# Patient Record
Sex: FEMALE | Race: WHITE | NOT HISPANIC OR LATINO | Employment: OTHER | URBAN - METROPOLITAN AREA
[De-identification: names, ages, dates, MRNs, and addresses within clinical notes are randomized per-mention and may not be internally consistent; named-entity substitution may affect disease eponyms.]

---

## 2021-05-25 ENCOUNTER — RECORDS - HEALTHEAST (OUTPATIENT)
Dept: ADMINISTRATIVE | Facility: CLINIC | Age: 86
End: 2021-05-25

## 2021-05-26 ENCOUNTER — RECORDS - HEALTHEAST (OUTPATIENT)
Dept: ADMINISTRATIVE | Facility: CLINIC | Age: 86
End: 2021-05-26

## 2021-05-27 ENCOUNTER — RECORDS - HEALTHEAST (OUTPATIENT)
Dept: ADMINISTRATIVE | Facility: CLINIC | Age: 86
End: 2021-05-27

## 2021-05-28 ENCOUNTER — RECORDS - HEALTHEAST (OUTPATIENT)
Dept: ADMINISTRATIVE | Facility: CLINIC | Age: 86
End: 2021-05-28

## 2021-07-13 ENCOUNTER — RECORDS - HEALTHEAST (OUTPATIENT)
Dept: ADMINISTRATIVE | Facility: CLINIC | Age: 86
End: 2021-07-13

## 2021-07-23 ENCOUNTER — RECORDS - HEALTHEAST (OUTPATIENT)
Dept: ADMINISTRATIVE | Facility: CLINIC | Age: 86
End: 2021-07-23

## 2022-10-06 ENCOUNTER — HOSPITAL ENCOUNTER (EMERGENCY)
Facility: HOSPITAL | Age: 87
Discharge: HOME | End: 2022-10-06
Payer: COMMERCIAL

## 2022-10-06 VITALS
HEIGHT: 61 IN | HEART RATE: 67 BPM | SYSTOLIC BLOOD PRESSURE: 181 MMHG | DIASTOLIC BLOOD PRESSURE: 77 MMHG | OXYGEN SATURATION: 98 % | BODY MASS INDEX: 21.37 KG/M2 | WEIGHT: 113.2 LBS | TEMPERATURE: 98.3 F | RESPIRATION RATE: 16 BRPM

## 2022-10-06 DIAGNOSIS — I48.20 CHRONIC ATRIAL FIBRILLATION (H): ICD-10-CM

## 2022-10-06 DIAGNOSIS — R20.2 HAND TINGLING: Primary | ICD-10-CM

## 2022-10-06 LAB
ANION GAP SERPL CALCULATED.3IONS-SCNC: 13 MMOL/L (ref 7–15)
BASOPHILS # BLD AUTO: 0.1 10E3/UL (ref 0–0.2)
BASOPHILS NFR BLD AUTO: 1 %
BUN SERPL-MCNC: 24.8 MG/DL (ref 8–23)
CALCIUM SERPL-MCNC: 9.9 MG/DL (ref 8.2–9.6)
CHLORIDE SERPL-SCNC: 98 MMOL/L (ref 98–107)
CREAT SERPL-MCNC: 1.27 MG/DL (ref 0.51–0.95)
DEPRECATED HCO3 PLAS-SCNC: 26 MMOL/L (ref 22–29)
EOSINOPHIL # BLD AUTO: 0.2 10E3/UL (ref 0–0.7)
EOSINOPHIL NFR BLD AUTO: 2 %
ERYTHROCYTE [DISTWIDTH] IN BLOOD BY AUTOMATED COUNT: 13.6 % (ref 10–15)
GFR SERPL CREATININE-BSD FRML MDRD: 39 ML/MIN/1.73M2
GLUCOSE SERPL-MCNC: 104 MG/DL (ref 70–99)
HCT VFR BLD AUTO: 38.4 % (ref 35–47)
HGB BLD-MCNC: 12.2 G/DL (ref 11.7–15.7)
HOLD SPECIMEN: NORMAL
IMM GRANULOCYTES # BLD: 0 10E3/UL
IMM GRANULOCYTES NFR BLD: 0 %
LYMPHOCYTES # BLD AUTO: 1.9 10E3/UL (ref 0.8–5.3)
LYMPHOCYTES NFR BLD AUTO: 24 %
MAGNESIUM SERPL-MCNC: 1.9 MG/DL (ref 1.7–2.3)
MCH RBC QN AUTO: 32.9 PG (ref 26.5–33)
MCHC RBC AUTO-ENTMCNC: 31.8 G/DL (ref 31.5–36.5)
MCV RBC AUTO: 104 FL (ref 78–100)
MONOCYTES # BLD AUTO: 0.8 10E3/UL (ref 0–1.3)
MONOCYTES NFR BLD AUTO: 11 %
NEUTROPHILS # BLD AUTO: 4.8 10E3/UL (ref 1.6–8.3)
NEUTROPHILS NFR BLD AUTO: 62 %
NRBC # BLD AUTO: 0 10E3/UL
NRBC BLD AUTO-RTO: 0 /100
PLATELET # BLD AUTO: 199 10E3/UL (ref 150–450)
POTASSIUM SERPL-SCNC: 4.1 MMOL/L (ref 3.4–5.3)
RBC # BLD AUTO: 3.71 10E6/UL (ref 3.8–5.2)
SODIUM SERPL-SCNC: 137 MMOL/L (ref 136–145)
WBC # BLD AUTO: 7.7 10E3/UL (ref 4–11)

## 2022-10-06 PROCEDURE — 83735 ASSAY OF MAGNESIUM: CPT | Performed by: EMERGENCY MEDICINE

## 2022-10-06 PROCEDURE — 36415 COLL VENOUS BLD VENIPUNCTURE: CPT | Performed by: EMERGENCY MEDICINE

## 2022-10-06 PROCEDURE — 99283 EMERGENCY DEPT VISIT LOW MDM: CPT

## 2022-10-06 PROCEDURE — 85025 COMPLETE CBC W/AUTO DIFF WBC: CPT | Performed by: EMERGENCY MEDICINE

## 2022-10-06 PROCEDURE — 82310 ASSAY OF CALCIUM: CPT | Performed by: EMERGENCY MEDICINE

## 2022-10-06 ASSESSMENT — ENCOUNTER SYMPTOMS
NAUSEA: 0
CHILLS: 0
ABDOMINAL PAIN: 0
DYSURIA: 0
FEVER: 0
VOMITING: 0
SHORTNESS OF BREATH: 0
NECK PAIN: 0
FREQUENCY: 0
BACK PAIN: 0
COUGH: 0
WOUND: 0

## 2022-10-06 NOTE — DISCHARGE INSTRUCTIONS
Please bring this paperwork with you to your follow-up appointment.    You were seen in the urgent care/emergency department for tingling in left fingers.     Your electrolytes look normal today. We suspect this is a nerve problem, especially since it is worse at night when you sleep on your left arm, however, we were unable to rule out a stroke today. We recommended an MRI today, but this was declined. If symptoms continue or do not get  better, please go to an ER for MRI to make sure this is not a stroke.     For your symptoms:  Please wear the wrist splint at night to see if this helps with the tingling. If it does, then this is likely a nerve problem. Please called your orthopedic specialist or call [facility] at the number provided for ongoing management of your nerve problems.     Tylenol/ibuprofen as needed  You may take up to 650 mg of Tylenol (acetaminophen) up to 4 times daily and up to 600 mg of ibuprofen up to 4 times daily as needed for fever, pain.  Please do not take more than the daily maximum recommended dose (tylenol = 4 grams, ibuprofen = 2.4 grams) as it can cause harm to your liver, kidneys, stomach.  It is best to take ibuprofen with food. Please read labels of any over-the-counter medicine you may be taking as it may contain Tylenol (acetaminophen) or Advil (ibuprofen).     Follow up with your primary care provider for recheck in 3 days for ER follow up.     A referral was placed for you to establish care with a cardiologist. They will call you to make an appointment.     Return to the emergency department if you develop worsening numbness or tingling, facial drooping, difficulty speaking, or any other new worsening or concerning symptoms. We'd be happy to see you again.    Thank you for allowing us to be part of your care today.    Take care!  -Roxie Pardo PA-C

## 2022-10-06 NOTE — ED TRIAGE NOTES
Patient brought to ED for evaluation of intermittent left hand/finger numbness. First episode was night before last, which lasted few minutes and resolved and another episode last night at 0300 hours which also resolved after a few minutes. Denies currently.     Triage Assessment     Row Name 10/06/22 1513       Triage Assessment (Adult)    Airway WDL WDL       Respiratory WDL    Respiratory WDL WDL       Skin Circulation/Temperature WDL    Skin Circulation/Temperature WDL WDL       Cardiac WDL    Cardiac WDL WDL       Peripheral/Neurovascular WDL    Peripheral Neurovascular WDL WDL       Cognitive/Neuro/Behavioral WDL    Cognitive/Neuro/Behavioral WDL WDL

## 2022-10-06 NOTE — ED PROVIDER NOTES
EMERGENCY DEPARTMENT ENCOUNTER      NAME: Olamide Grewal  AGE: 89 year old female  YOB: 1928  MRN: [id number]  EVALUATION DATE & TIME: No admission date for patient encounter.    PCP: [facility]    ED PROVIDER: Roxie Pardo PA-C      No chief complaint on file.    FINAL IMPRESSION:  1. Hand tingling    2. Chronic atrial fibrillation (H)          MEDICAL DECISION MAKING:    Pertinent Labs & Imaging studies reviewed. (See chart for details)  Olamide Grewal is a 89 year old femalewho presents for evaluation of tingling in the fingers of her left hand. Reports this has occurred twice over the past week, notices symptoms when she wakes up from sleep after laying on her left arm, symptoms do not occur during the day. Denies facial drooping, numbness, difficulty speaking.      On my initial evaluation, patient slightly hypertensive, but remainder of  vital signs normal. On physical exam patient is awake, alert, no acute distress, resting comfortably in chair.  Was able to ambulate to the chair without difficulty.  Heart sounds are normal, lungs are clear.  No abdominal tenderness on palpation.  No neck or back tenderness over her CTLS.  No pain on palpation of her left shoulder, elbow, wrist.  Normal strength and sensation bilaterally, negative Tinel's sign, negative Phalen sign.  Good cap refill bilaterally, good pulses.  Full neurologic exam without focal deficit.  Cranial nerves III through XII intact..    Differential diagnosis includes ulnar nerve pathology, radial nerve pathology, carpal tunnel syndrome, cervical spine pathology, electrolyte imbalance, CVA, brain tumor, intracranial bleed or other intracranial pathology. Emergency department workup included cbc, bmp, Mg. Patient declined wanting anything for pain.     Electrolytes within normal limits. Based on patient history and complete resolution of symptoms on exam, could be radiculopathy in nature especially since  symptoms come on at night after she lays on her left arm, though could be CVA/ TIA, did recommend to patient that we obtain an MRI for further evaluation, however patient verbalizes frustration with long wait times and would like to be discharged without MRI. Did discuss importance of obtaining imaging to rule out stroke, bleed, tumor or other intracranial pathology, however patient is adamant that she is not having a stroke and would like to leave. Low suspicion for acute intracranial pathology, however did discuss with patient the importance of returning or going to another ER for ongoing or worsening symptoms.     Did provide wrist splint and gave patient referral information for [facility] orthopedics for ongoing management of possible nerve pathology such as carpal tunnel syndrome or cervical neck pathology. Also placed order for cardiology as patient would like to find a new cardiologist for ongoing management of her atrial fibrillation.     Patient has had serial examinations and notes significant improvement.     Patient was discharged in stable condition with treatment plan as below. Instructed to follow up with primary care provider in 3 days and return to the emergency department with any new or worsening of symptoms. Patient expressed understanding, feels comfortable, and is in agreement with this plan. All questions addressed prior to discharge.    ED COURSE:  4:11 PM  I reviewed the patient's chart. I met with the patient to gather history and to perform my initial exam.    I wore appropriate PPE during this encounter including: facemask & eye protection   6:00 PM  I rechecked the patient and updated her on results. Discussed MRI patient declines this and voiced frustration with long wait times.  6:19 PM  We discussed plan for discharge including treatment plan, follow-up and return precautions to emergency department.  Patient voiced understanding and in agreement with this plan.    At the conclusion of  "the encounter I discussed the results of all of the tests and the disposition. The questions were answered. The patient or family acknowledged understanding and was agreeable with the care plan.     MEDICATIONS GIVEN IN THE EMERGENCY:  Medications - No data to display    NEW PRESCRIPTIONS STARTED AT TODAY'S ER VISIT  There are no discharge medications for this patient.      =================================================================    HPI:    Patient information was obtained from: patient and PCA    Use of Interpretor: N/A         Olamide Grewal is a 89 year old female with a pertinent history of chronic atrial fibrillation, heart failure,  Hypothyroidism, chronic right shoulder pain from rotator cuff tendinopathy who presents to this ED for evaluation of left hand tingling.     Patient reports for the past 2 nights, she has woken up with left hand numbness and tingling.  Denies any pain or weakness.  Reports the tingling is mostly in her fingers, including all 5 fingers.  She sleeps on her left side.  Reports symptoms wake her up around 3 AM and she \"places her fingers under her left thigh to warm them up\" which help resolve the symptoms.  Has a history of mastectomy on the left side.  Reports symptoms only happened twice and have not returned today. Denies pain in her right arm. Denies pain in her shoulder, neck, back, elbow.  Denies facial drooping, difficulty speaking, facial numbness or tingling, difficulty walking or other weakness.  She has the same symptoms in her right arm from a rotator cuff surgery years ago and has Numbness and tingling in her right hand, for which she wears a brace at night for and received cortisone shots for.  Denies known injury or falls, denies head injury, loss of consciousness.  Reorts same symptoms happened about 2 weeks ago, but she did not think anything of it as it only happened 1 time.  Denies chest pain, shortness of breath, headache, lightheadedness, nausea, vomiting, " "abdominal pain, lower extremity weakness, difficulty walking, urinary symptoms.     REVIEW OF SYSTEMS:  Review of Systems   Constitutional: Negative for chills and fever.   Respiratory: Negative for cough and shortness of breath.    Cardiovascular: Negative for chest pain and leg swelling.   Gastrointestinal: Negative for abdominal pain, nausea and vomiting.   Genitourinary: Negative for dysuria and frequency.   Musculoskeletal: Negative for back pain, gait problem and neck pain.        Positive for left finger tingling.   Skin: Negative for rash and wound.   All other systems reviewed and are negative.       PAST MEDICAL HISTORY:  No past medical history on file.    PAST SURGICAL HISTORY:  No past surgical history on file.    CURRENT MEDICATIONS:    No current facility-administered medications for this encounter.  No current outpatient medications on file.    ALLERGIES:  No Known Allergies    FAMILY HISTORY:  No family history on file.    SOCIAL HISTORY:   Social History     Socioeconomic History     Marital status:        VITALS:  Patient Vitals for the past 24 hrs:   BP Temp Temp src Pulse Resp SpO2 Height Weight   10/06/22 1851 (!) 181/77 -- -- 67 16 98 % -- --   10/06/22 1506 (!) 163/59 98.3  F (36.8  C) Temporal 68 18 100 % 1.549 m (5' 1\") 51.3 kg (113 lb 3.2 oz)       PHYSICAL EXAM    Constitutional: Well developed, Well nourished, NAD  HENT: Normocephalic, Atraumatic, Bilateral external ears normal, Oropharynx normal, mucous membranes moist, Nose normal.   Neck: Normal range of motion, No tenderness, Supple, No stridor.  Eyes: PERRL, EOMI, Conjunctiva normal, No discharge.   Respiratory: Normal breath sounds, No respiratory distress, No wheezing, Speaks full sentences easily. No cough.  Cardiovascular: Normal heart rate, Regular rhythm, No murmurs, No rubs, No gallops. Chest wall nontender.  GI: Soft, No tenderness, No masses, No flank tenderness. No rebound or guarding.  Musculoskeletal: 2+ DP " pulses. No edema. No cyanosis, No clubbing. Good range of motion in all major joints. No tenderness to palpation or major deformities noted. No tenderness of the CTLS spine.   Integument: Warm, Dry, No erythema, No rash. No petechiae.  Neurologic: Alert & oriented x 3, Normal motor function, Normal sensory function, No focal deficits noted. Normal gait. negative Tinel's sign, negative Phalen sign.    Psychiatric: Affect normal, Judgment normal, Mood normal. Cooperative.    LAB:  All pertinent labs reviewed and interpreted.  Labs Ordered and Resulted from Time of ED Arrival to Time of ED Departure   BASIC METABOLIC PANEL - Abnormal       Result Value    Sodium 137      Potassium 4.1      Chloride 98      Carbon Dioxide (CO2) 26      Anion Gap 13      Urea Nitrogen 24.8 (*)     Creatinine 1.27 (*)     Calcium 9.9 (*)     Glucose 104 (*)     GFR Estimate 39 (*)    CBC WITH PLATELETS AND DIFFERENTIAL - Abnormal    WBC Count 7.7      RBC Count 3.71 (*)     Hemoglobin 12.2      Hematocrit 38.4       (*)     MCH 32.9      MCHC 31.8      RDW 13.6      Platelet Count 199      % Neutrophils 62      % Lymphocytes 24      % Monocytes 11      % Eosinophils 2      % Basophils 1      % Immature Granulocytes 0      NRBCs per 100 WBC 0      Absolute Neutrophils 4.8      Absolute Lymphocytes 1.9      Absolute Monocytes 0.8      Absolute Eosinophils 0.2      Absolute Basophils 0.1      Absolute Immature Granulocytes 0.0      Absolute NRBCs 0.0     MAGNESIUM - Normal    Magnesium 1.9         RADIOLOGY:  Reviewed all pertinent imaging. Please see official radiology report.  No orders to display     Diagnosis:  1. Hand tingling    2. Chronic atrial fibrillation (H)        Roxie Pardo PA-C  Emergency Medicine  [facility]  10/6/2022       Roxie Pardo PA-C  10/07/22 0000

## 2022-10-06 NOTE — ED NOTES
ED Provider In Triage Note  [facility]  Encounter Date: Oct 6, 2022    No chief complaint on file.      Brief HPI:   Olamide Grewal is a 89 year old female presenting to the Emergency Department with a chief complaint of left finger numbness   Intermittent for the last 2 days  No pain  No weakness  Unknown if change in color  Sometimes runs under hot water  Has numbness in the right fingers but better with steroid shot in rotator cuff  Same feelings now on left hand  No cp  No sob  No weakness  Or pain    Brief Physical Exam:  There were no vitals taken for this visit.  General: Non-toxic appearing  Well appearing  Strong pulses  Normal sensation  History of left mastectomy  No edema      Left fingers numbnes for 2 days  At night  Better after warming arm under leg  Occur between 3 and 4 am  Same on right hand but better with steroid injection    Plan Initiated in Triage:  No orders of the defined types were placed in this encounter.      PIT Dispo:   Return to lobby while awaiting workup and ED bed availability    An Sanchez MD on 10/6/2022 at 3:04 PM    Patient was evaluated by the Physician in Triage due to a limitation of available rooms in the Emergency Department. A plan of care was discussed based on the information obtained on the initial evaluation and patient was consuled to return back to the Emergency Department lobby after this initial evalutaiton until results were obtained or a room became available in the Emergency Department. Patient was counseled not to leave prior to receiving the results of their workup.     An Sanchez MD  [facility] EMERGENCY DEPARTMENT  [address] [place] [id number]  [phone]       An Sanchez MD  10/06/22 0896

## 2024-06-20 ENCOUNTER — LAB REQUISITION (OUTPATIENT)
Dept: LAB | Facility: CLINIC | Age: 89
End: 2024-06-20

## 2024-06-20 DIAGNOSIS — E03.9 HYPOTHYROIDISM, UNSPECIFIED: ICD-10-CM

## 2024-06-20 DIAGNOSIS — I10 ESSENTIAL (PRIMARY) HYPERTENSION: ICD-10-CM

## 2024-06-20 PROCEDURE — 84443 ASSAY THYROID STIM HORMONE: CPT | Performed by: PHYSICIAN ASSISTANT

## 2024-06-20 PROCEDURE — 80048 BASIC METABOLIC PNL TOTAL CA: CPT | Performed by: PHYSICIAN ASSISTANT

## 2024-06-21 LAB
ANION GAP SERPL CALCULATED.3IONS-SCNC: 13 MMOL/L (ref 7–15)
BUN SERPL-MCNC: 28.8 MG/DL (ref 8–23)
CALCIUM SERPL-MCNC: 10.3 MG/DL (ref 8.2–9.6)
CHLORIDE SERPL-SCNC: 104 MMOL/L (ref 98–107)
CREAT SERPL-MCNC: 1.07 MG/DL (ref 0.51–0.95)
DEPRECATED HCO3 PLAS-SCNC: 21 MMOL/L (ref 22–29)
EGFRCR SERPLBLD CKD-EPI 2021: 47 ML/MIN/1.73M2
GLUCOSE SERPL-MCNC: 103 MG/DL (ref 70–99)
POTASSIUM SERPL-SCNC: 4 MMOL/L (ref 3.4–5.3)
SODIUM SERPL-SCNC: 138 MMOL/L (ref 135–145)
TSH SERPL DL<=0.005 MIU/L-ACNC: 1.14 UIU/ML (ref 0.3–4.2)